# Patient Record
Sex: FEMALE | Race: WHITE | NOT HISPANIC OR LATINO | Employment: UNEMPLOYED | URBAN - METROPOLITAN AREA
[De-identification: names, ages, dates, MRNs, and addresses within clinical notes are randomized per-mention and may not be internally consistent; named-entity substitution may affect disease eponyms.]

---

## 2019-03-23 ENCOUNTER — HOSPITAL ENCOUNTER (EMERGENCY)
Facility: HOSPITAL | Age: 34
Discharge: HOME | End: 2019-03-23
Attending: EMERGENCY MEDICINE

## 2019-03-23 ENCOUNTER — APPOINTMENT (EMERGENCY)
Dept: RADIOLOGY | Facility: HOSPITAL | Age: 34
End: 2019-03-23

## 2019-03-23 VITALS
WEIGHT: 120 LBS | HEART RATE: 72 BPM | DIASTOLIC BLOOD PRESSURE: 55 MMHG | TEMPERATURE: 98.9 F | RESPIRATION RATE: 18 BRPM | OXYGEN SATURATION: 98 % | SYSTOLIC BLOOD PRESSURE: 94 MMHG

## 2019-03-23 DIAGNOSIS — N93.9 VAGINAL BLEEDING: Primary | ICD-10-CM

## 2019-03-23 LAB
ABO GROUP BLD: NORMAL
ANION GAP BLD CALC-SCNC: 17 MMOL/L (ref 4–13)
B-HCG SERPL-ACNC: 4 MIU/ML
BACTERIA UR QL AUTO: ABNORMAL /HPF
BILIRUB UR QL STRIP: ABNORMAL
BLD GP AB SCN SERPL QL: NEGATIVE
BUN BLD-MCNC: 15 MG/DL (ref 5–25)
CA-I BLD-SCNC: 1.18 MMOL/L (ref 1.12–1.32)
CHLORIDE BLD-SCNC: 104 MMOL/L (ref 100–108)
CLARITY UR: ABNORMAL
COLOR UR: ABNORMAL
CREAT BLD-MCNC: 0.6 MG/DL (ref 0.6–1.3)
EXT PREG TEST URINE: NEGATIVE
GFR SERPL CREATININE-BSD FRML MDRD: 119 ML/MIN/1.73SQ M
GLUCOSE SERPL-MCNC: 121 MG/DL (ref 65–140)
GLUCOSE UR STRIP-MCNC: ABNORMAL MG/DL
HCT VFR BLD CALC: 32 % (ref 34.8–46.1)
HGB BLDA-MCNC: 10.9 G/DL (ref 11.5–15.4)
HGB UR QL STRIP.AUTO: ABNORMAL
KETONES UR STRIP-MCNC: ABNORMAL MG/DL
LEUKOCYTE ESTERASE UR QL STRIP: ABNORMAL
NITRITE UR QL STRIP: ABNORMAL
NON-SQ EPI CELLS URNS QL MICRO: ABNORMAL /HPF
PCO2 BLD: 26 MMOL/L (ref 21–32)
PH UR STRIP.AUTO: ABNORMAL [PH]
POTASSIUM BLD-SCNC: 3.4 MMOL/L (ref 3.5–5.3)
PROT UR STRIP-MCNC: ABNORMAL MG/DL
RBC #/AREA URNS AUTO: ABNORMAL /HPF
RH BLD: POSITIVE
SODIUM BLD-SCNC: 143 MMOL/L (ref 136–145)
SP GR UR STRIP.AUTO: 1.03 (ref 1–1.03)
SPECIMEN EXPIRATION DATE: NORMAL
SPECIMEN SOURCE: ABNORMAL
UROBILINOGEN UR QL STRIP.AUTO: ABNORMAL E.U./DL
WBC #/AREA URNS AUTO: ABNORMAL /HPF

## 2019-03-23 PROCEDURE — 76815 OB US LIMITED FETUS(S): CPT

## 2019-03-23 PROCEDURE — 86901 BLOOD TYPING SEROLOGIC RH(D): CPT | Performed by: EMERGENCY MEDICINE

## 2019-03-23 PROCEDURE — 86850 RBC ANTIBODY SCREEN: CPT | Performed by: EMERGENCY MEDICINE

## 2019-03-23 PROCEDURE — 84702 CHORIONIC GONADOTROPIN TEST: CPT | Performed by: EMERGENCY MEDICINE

## 2019-03-23 PROCEDURE — 81001 URINALYSIS AUTO W/SCOPE: CPT | Performed by: EMERGENCY MEDICINE

## 2019-03-23 PROCEDURE — 99284 EMERGENCY DEPT VISIT MOD MDM: CPT

## 2019-03-23 PROCEDURE — 86900 BLOOD TYPING SEROLOGIC ABO: CPT | Performed by: EMERGENCY MEDICINE

## 2019-03-23 PROCEDURE — 36415 COLL VENOUS BLD VENIPUNCTURE: CPT | Performed by: EMERGENCY MEDICINE

## 2019-03-23 PROCEDURE — 87086 URINE CULTURE/COLONY COUNT: CPT | Performed by: EMERGENCY MEDICINE

## 2019-03-23 PROCEDURE — 81025 URINE PREGNANCY TEST: CPT | Performed by: EMERGENCY MEDICINE

## 2019-03-23 PROCEDURE — 85014 HEMATOCRIT: CPT

## 2019-03-23 PROCEDURE — 80047 BASIC METABLC PNL IONIZED CA: CPT

## 2019-03-23 RX ORDER — ACETAMINOPHEN 325 MG/1
975 TABLET ORAL ONCE
Status: DISCONTINUED | OUTPATIENT
Start: 2019-03-23 | End: 2019-03-23 | Stop reason: HOSPADM

## 2019-03-23 NOTE — ED PROVIDER NOTES
History  Chief Complaint   Patient presents with    Vaginal Bleeding - Pregnant     pt notes vaginal bleeding (brown clots yesterday, red today)  recent positive pregnancy tests  Pt also c/o back pain and abdominal cramping  27-year-old  at 4-5 weeks by dates presents for evaluation of vaginal bleeding  LMP   Patient reports a small amount of brown spotting starting yesterday  She was passing bright red blood with clots today  She has not bled through an entire pad  She notes lower abdominal cramping and low back pain  She previously had 3 C sections without other abdominal surgeries  She has been otherwise well with a negative review of systems  Patient has yet to see OB or have IUP confirmed by ultrasound  She does have an OB appointment scheduled  On arrival, patient's heart rate is 99 with otherwise normal vital signs  Physical exam shows a patient in no acute distress with a benign abdominal exam, no midline low back tenderness, and no CVA tenderness  Will perform bedside transabdominal ultrasound to evaluate for IUP  Will perform formal pelvic ultrasound, if needed  Will perform I-STAT chemistry to evaluate for hemoglobin  Will send urine dip to evaluate for UTI  Will send beta quantitative HCG and type and screen  None       History reviewed  No pertinent past medical history  Past Surgical History:   Procedure Laterality Date     SECTION      x3       History reviewed  No pertinent family history  I have reviewed and agree with the history as documented  Social History     Tobacco Use    Smoking status: Never Smoker   Substance Use Topics    Alcohol use: Never     Frequency: Never    Drug use: Never        Review of Systems   Constitutional: Negative for chills and fever  HENT: Negative for rhinorrhea and sore throat  Eyes: Negative for photophobia and visual disturbance  Respiratory: Negative for cough and shortness of breath  Cardiovascular: Negative for chest pain and leg swelling  Gastrointestinal: Positive for abdominal pain  Negative for diarrhea, nausea and vomiting  Genitourinary: Positive for vaginal bleeding  Negative for dysuria, frequency, hematuria, vaginal discharge and vaginal pain  Musculoskeletal: Positive for back pain  Negative for neck pain  Skin: Negative for rash and wound  Neurological: Negative for light-headedness and headaches  Hematological: Does not bruise/bleed easily  Physical Exam  ED Triage Vitals   Temperature Pulse Respirations Blood Pressure SpO2   03/23/19 1250 03/23/19 1249 03/23/19 1249 03/23/19 1249 03/23/19 1249   98 9 °F (37 2 °C) 99 16 119/56 100 %      Temp Source Heart Rate Source Patient Position - Orthostatic VS BP Location FiO2 (%)   03/23/19 1249 03/23/19 1249 03/23/19 1249 03/23/19 1249 --   Oral Monitor Sitting Right arm       Pain Score       03/23/19 1249       3             Orthostatic Vital Signs  Vitals:    03/23/19 1249 03/23/19 1533   BP: 119/56 94/55   Pulse: 99 72   Patient Position - Orthostatic VS: Sitting Sitting       Physical Exam   Constitutional: She is oriented to person, place, and time  She appears well-developed and well-nourished  No distress  HENT:   Head: Normocephalic and atraumatic  Eyes: Pupils are equal, round, and reactive to light  EOM are normal    Neck: Normal range of motion  Neck supple  Cardiovascular: Normal rate, regular rhythm and normal heart sounds  Exam reveals no gallop and no friction rub  No murmur heard  Pulmonary/Chest: Effort normal and breath sounds normal  No respiratory distress  She has no wheezes  She has no rales  Abdominal: Soft  She exhibits no distension  There is no tenderness  There is no rebound and no guarding  Musculoskeletal: She exhibits no edema or tenderness (  No midline spinal tenderness or CVA tenderness, no rash)  Neurological: She is alert and oriented to person, place, and time     Skin: Skin is warm and dry  Capillary refill takes less than 2 seconds  She is not diaphoretic  Psychiatric: She has a normal mood and affect  Her behavior is normal  Thought content normal    Vitals reviewed        ED Medications  Medications - No data to display    Diagnostic Studies  Results Reviewed     Procedure Component Value Units Date/Time    POCT pregnancy, urine [[id number]]  (Normal) Resulted:  03/23/19 1517    Lab Status:  Final result Updated:  03/23/19 1517     EXT PREG TEST UR (Ref: Negative) negative    Urine Microscopic [[id number]]  (Abnormal) Collected:  03/23/19 1340    Lab Status:  Final result Specimen:  Urine, Other Updated:  03/23/19 1427     RBC, UA Innumerable /hpf      WBC, UA 2-4 /hpf      Epithelial Cells None Seen /hpf      Bacteria, UA None Seen /hpf      URINE COMMENT --    Quantitative hCG [[id number]]  (Normal) Collected:  03/23/19 1336    Lab Status:  Final result Specimen:  Blood from Arm, Right Updated:  03/23/19 1411     HCG, Quant 4 mIU/mL     Narrative:        Expected Ranges:   Approximate               Approximate HCG  Gestation age          Concentration ( mIU/mL)  _____________          ______________________   Reeda Ponto                      HCG values  0 2-1                       5-50  1-2                           2-3                         100-5000  3-4                         500-56965  4-5                         1000-14940  5-6                         [id number]  6-8                         [id number]  8-12                        [id number]    UA w Reflex to Microscopic w Reflex to Culture [[id number]]  (Abnormal) Collected:  03/23/19 1340    Lab Status:  Final result Specimen:  Urine, Other Updated:  03/23/19 1409     Color, UA Bloody     Clarity, UA Cloudy     Specific [place], UA 1 030     pH, UA Interference-unable to analyze     Leukocytes, UA Interference- unable to analyze     Nitrite, UA Interference- unable to analyze     Protein, UA       Interference- unable to analyze     mg/dl     Glucose, UA       Interference- unable to analyze     mg/dl     Ketones, UA       Interference- unable to analyze     mg/dl     Urobilinogen, UA       Interference-unable to analyze     E U /dl     Bilirubin, UA Interference- unable to analyze     Blood, UA Interference- unable to analyze     URINE COMMENT --    Urine culture [[id number]] Collected:  19 1340    Lab Status: In process Specimen:  Urine, Other Updated:  19 1409    POCT Chem 8+ [[id number]]  (Abnormal) Collected:  19 1341    Lab Status:  Final result Specimen:  Venous Updated:  19 1345     SODIUM, I-STAT 143 mmol/l      Potassium, i-STAT 3 4 mmol/L      Chloride, istat 104 mmol/L      CO2, i-STAT 26 mmol/L      Anion Gap, i-STAT 17 mmol/L      Calcium, Ionized i-STAT 1 18 mmol/L      BUN, I-STAT 15 mg/dl      Creatinine, i-STAT 0 6 mg/dl      eGFR 119 ml/min/1 73sq m      Glucose, i-STAT 121 mg/dl      Hct, i-STAT 32 %      Hgb, i-STAT 10 9 g/dl      Specimen Type VENOUS                 US OB pregnancy limited with transvaginal   Final Result by Tisha Edmondson MD ( [id number])      1  No intrauterine gestation is seen and no adnexal mass is evident  These findings represent a pregnancy of unknown location  The sonographic differential diagnosis includes: an intrauterine gestation too early to visualize, a spontaneous , or    an occult ectopic pregnancy  Continued close clinical follow-up, serial serum beta-HCG levels and short term sonographic follow up in 14 days, or earlier if clinically indicated, is recommended         2  Right ovarian corpus luteum  The study was marked in EPIC for significant notification  Workstation performed: [id number]               Procedures  Procedures      Phone Consults  ED Phone Contact    ED Course  ED Course as of Mar 24 0927   Sat Mar 23, 2019   1408 Bedside transabdominal ultrasound not able to visualize yolk sac or fetal pole    Patient currently in Radiology for formal pelvic ultrasound  [facility]  Number of Diagnoses or Management Options  Vaginal bleeding:   Diagnosis management comments: Patient hemodynamically normal with a benign abdominal exam while in the ED  Formal pelvic US showed no evidence of IUP and no adnexal pathology  Beta quant 4  Hemoglobin 10 9  Rh +  Likely miscarriage with normalization of quant  Patient was discharged with close outpatient OB follow up and strict return precautions for worsening symptoms  Disposition  Final diagnoses:   Vaginal bleeding     Time reflects when diagnosis was documented in both MDM as applicable and the Disposition within this note     Time User Action Codes Description Comment    3/23/2019  3:39 PM Dedrick Hall Add [N93 9] Vaginal bleeding       ED Disposition     ED Disposition Condition Date/Time Comment    Discharge Stable Sat Mar 23, 2019  3:39 PM Kobe Henry discharge to home/self care  Follow-up Information     Follow up With Specialties Details Why Contact Info Additional [facility] Schedule an appointment as soon as possible for a visit in 2 days For re-evaluation and further management [address] [place] [address] Emergency Department Emergency Medicine  As needed [address] [place] [address]  [phone]  ED, [address] [place] [address], [facility]          There are no discharge medications for this patient  No discharge procedures on file  ED Provider  Attending physically available and evaluated Kobe Henry I managed the patient along with the ED Attending      Electronically Signed by         Christine Madison MD  03/24/19 6939

## 2019-03-23 NOTE — ED ATTENDING ATTESTATION
Adrienne Lopez MD, saw and evaluated the patient  I have discussed the patient with the resident/non-physician practitioner and agree with the resident's/non-physician practitioner's findings, Plan of Care, and MDM as documented in the resident's/non-physician practitioner's note, except where noted  All available labs and Radiology studies were reviewed  I was present for key portions of any procedure(s) performed by the resident/non-physician practitioner and I was immediately available to provide assistance  At this point I agree with the current assessment done in the Emergency Department    I have conducted an independent evaluation of this patient a history and physical is as follows:  Pt is approx 4 weeks by dates started with bleeding yesterday and now increased bleeding and cramping  PE: alert nad heart reg lungs clear abd soft mild suprapubic tenderness MDM: will get us and hcg  Critical Care Time  Procedures

## 2019-03-24 LAB — BACTERIA UR CULT: NORMAL

## 2019-04-03 ENCOUNTER — OFFICE VISIT (OUTPATIENT)
Dept: OBGYN CLINIC | Facility: CLINIC | Age: 34
End: 2019-04-03
Payer: COMMERCIAL

## 2019-04-03 VITALS
DIASTOLIC BLOOD PRESSURE: 60 MMHG | WEIGHT: 120 LBS | BODY MASS INDEX: 19.99 KG/M2 | SYSTOLIC BLOOD PRESSURE: 100 MMHG | HEIGHT: 65 IN

## 2019-04-03 DIAGNOSIS — O03.9 SPONTANEOUS ABORTION IN FIRST TRIMESTER: Primary | ICD-10-CM

## 2019-04-03 DIAGNOSIS — R10.11 RUQ PAIN: ICD-10-CM

## 2019-04-03 PROCEDURE — 99203 OFFICE O/P NEW LOW 30 MIN: CPT | Performed by: OBSTETRICS & GYNECOLOGY

## 2019-04-03 RX ORDER — ALBUTEROL SULFATE 90 UG/1
2 AEROSOL, METERED RESPIRATORY (INHALATION) EVERY 4 HOURS PRN
COMMUNITY

## 2019-10-08 ENCOUNTER — ANNUAL EXAM (OUTPATIENT)
Dept: OBGYN CLINIC | Facility: CLINIC | Age: 34
End: 2019-10-08
Payer: COMMERCIAL

## 2019-10-08 VITALS
DIASTOLIC BLOOD PRESSURE: 80 MMHG | HEIGHT: 64 IN | BODY MASS INDEX: 21 KG/M2 | WEIGHT: 123 LBS | SYSTOLIC BLOOD PRESSURE: 122 MMHG

## 2019-10-08 DIAGNOSIS — Z30.011 BCP (BIRTH CONTROL PILLS) INITIATION: Primary | ICD-10-CM

## 2019-10-08 DIAGNOSIS — Z01.419 ENCOUNTER FOR ANNUAL ROUTINE GYNECOLOGICAL EXAMINATION: ICD-10-CM

## 2019-10-08 PROCEDURE — 99395 PREV VISIT EST AGE 18-39: CPT | Performed by: OBSTETRICS & GYNECOLOGY

## 2019-10-08 RX ORDER — NORGESTIMATE AND ETHINYL ESTRADIOL 0.25-0.035
1 KIT ORAL DAILY
Qty: 90 TABLET | Refills: 3 | Status: SHIPPED | OUTPATIENT
Start: 2019-10-08

## 2019-10-08 NOTE — PROGRESS NOTES
Impression:  1  Normal breast and gyn exam    Plan:  1  Rx orthocyclen, start   2  RTO for pap   3  Healthy diet and exercise discussed      Subjective:      Patient ID: Jeny Bautista is a 28 y o    female  S/p spontaneous AB  She is currently sexually active and would like to start OCs for birth control, as she does not plan to have anymore pregnancies  Since her miscarriage, periods have been once per month but heavier  Review of Systems   Constitutional: Negative  HENT: Negative  Eyes: Negative  Respiratory: Negative  Cardiovascular: Negative  Gastrointestinal: Negative  Endocrine: Negative  Genitourinary: Positive for menstrual problem  Musculoskeletal: Negative  Skin: Negative  Allergic/Immunologic: Negative  Neurological: Negative  Hematological: Negative  Psychiatric/Behavioral: Negative  All other systems reviewed and are negative  Objective:      /80 (BP Location: Left arm, Patient Position: Sitting, Cuff Size: Standard)   Ht 5' 3 98" (1 625 m)   Wt 55 8 kg (123 lb)   LMP 10/04/2019 (Exact Date)   BMI 21 13 kg/m²          Physical Exam   Constitutional: She is oriented to person, place, and time  She appears well-developed and well-nourished  HENT:   Head: Normocephalic and atraumatic  Neck: Normal range of motion  Neck supple  No tracheal deviation present  No thyromegaly present  Cardiovascular: Normal rate, regular rhythm and normal heart sounds  Pulmonary/Chest: Effort normal and breath sounds normal  No stridor  No respiratory distress  She has no wheezes  She has no rales  She exhibits no tenderness  Right breast exhibits no inverted nipple, no mass, no nipple discharge, no skin change and no tenderness  Left breast exhibits no inverted nipple, no mass, no nipple discharge, no skin change and no tenderness  No breast swelling, tenderness, discharge or bleeding  Breasts are symmetrical    Abdominal: Soft   Bowel sounds are normal  She exhibits no distension and no mass  There is no tenderness  There is no rebound and no guarding  No hernia  Hernia confirmed negative in the right inguinal area and confirmed negative in the left inguinal area  Genitourinary: Vagina normal and uterus normal  Rectal exam shows no external hemorrhoid, no internal hemorrhoid, no fissure and no mass  No breast swelling, tenderness, discharge or bleeding  No labial fusion  There is no rash, tenderness, lesion or injury on the right labia  There is no rash, tenderness, lesion or injury on the left labia  Uterus is not deviated, not enlarged, not fixed and not tender  Cervix exhibits no motion tenderness, no discharge and no friability  Right adnexum displays no mass, no tenderness and no fullness  Left adnexum displays no mass, no tenderness and no fullness  No erythema, tenderness or bleeding in the vagina  No foreign body in the vagina  No signs of injury around the vagina  No vaginal discharge found  Genitourinary Comments: Unable to perform pap smear because still bleeding from menstrual cycle  Lymphadenopathy: No inguinal adenopathy noted on the right or left side  Neurological: She is alert and oriented to person, place, and time  Skin: Skin is warm and dry  Psychiatric: She has a normal mood and affect   Her behavior is normal  Judgment and thought content normal

## 2019-10-08 NOTE — PROGRESS NOTES
The patient is here for a yearly  The patient has her period but she wants to have the pap smear if she can have it  She has new insurance  The patient has been having regular periods  The patient has been having longer periods  Her periods have been 10-15 days long  The patient has heavy periods  No vaginal or urinary issues  No breast concerns

## 2019-10-15 ENCOUNTER — OFFICE VISIT (OUTPATIENT)
Dept: OBGYN CLINIC | Facility: CLINIC | Age: 34
End: 2019-10-15
Payer: COMMERCIAL

## 2019-10-15 VITALS
WEIGHT: 127 LBS | DIASTOLIC BLOOD PRESSURE: 60 MMHG | SYSTOLIC BLOOD PRESSURE: 100 MMHG | HEIGHT: 64 IN | BODY MASS INDEX: 21.68 KG/M2

## 2019-10-15 DIAGNOSIS — Z11.51 SCREENING FOR HPV (HUMAN PAPILLOMAVIRUS): ICD-10-CM

## 2019-10-15 DIAGNOSIS — Z12.4 ROUTINE CERVICAL SMEAR: Primary | ICD-10-CM

## 2019-10-15 PROCEDURE — 99213 OFFICE O/P EST LOW 20 MIN: CPT | Performed by: PHYSICIAN ASSISTANT

## 2019-10-15 NOTE — PROGRESS NOTES
Assessment/Plan:    No problem-specific Assessment & Plan notes found for this encounter  Diagnoses and all orders for this visit:    Routine cervical smear  -     GP Pap Dependent HPV Cotest >= 27years old    Screening for HPV (human papillomavirus)  -     GP Pap Dependent HPV Cotest >= 27years old          Subjective:      Patient ID: Chris Cantor is a 27 y o  female  Pt is here for pap smear only  Has period at her last OV/yearly here last week  Bleeding has stopped  Has concerns about diastasis recti--info on [facility] pt given    Pap done      The following portions of the patient's history were reviewed and updated as appropriate: allergies, current medications, past family history, past medical history, past social history, past surgical history and problem list     Review of Systems   Constitutional: Negative for chills, fever and unexpected weight change  Gastrointestinal: Negative for abdominal pain, blood in stool, constipation and diarrhea  Genitourinary: Negative  Objective:      /60 (BP Location: Left arm, Patient Position: Sitting, Cuff Size: Standard)   Ht 5' 3 78" (1 62 m)   Wt 57 6 kg (127 lb)   LMP 10/04/2019 (Exact Date)   BMI 21 95 kg/m²          Physical Exam   Constitutional: She appears well-developed and well-nourished  Genitourinary: Vagina normal  Pelvic exam was performed with patient supine  There is no rash or lesion on the right labia  There is no rash or lesion on the left labia  Cervix exhibits no motion tenderness, no discharge and no friability  Lymphadenopathy: No inguinal adenopathy noted on the right or left side  Nursing note and vitals reviewed

## 2019-10-15 NOTE — PROGRESS NOTES
The patient is here for a pap smear  The patient has regular periods  No vaginal issues  The patient has no complaints

## 2019-10-23 LAB
HPV HR 12 DNA CVX QL NAA+PROBE: NOT DETECTED
HPV16 DNA SPEC QL NAA+PROBE: NOT DETECTED
HPV18 DNA SPEC QL NAA+PROBE: NOT DETECTED
THIN PREP CVX: NORMAL

## 2020-12-26 ENCOUNTER — NURSE TRIAGE (OUTPATIENT)
Dept: OTHER | Facility: OTHER | Age: 35
End: 2020-12-26

## 2020-12-26 DIAGNOSIS — Z11.59 ENCOUNTER FOR SCREENING FOR OTHER VIRAL DISEASES: Primary | ICD-10-CM

## 2020-12-27 DIAGNOSIS — Z11.59 ENCOUNTER FOR SCREENING FOR OTHER VIRAL DISEASES: ICD-10-CM

## 2020-12-27 PROCEDURE — U0003 INFECTIOUS AGENT DETECTION BY NUCLEIC ACID (DNA OR RNA); SEVERE ACUTE RESPIRATORY SYNDROME CORONAVIRUS 2 (SARS-COV-2) (CORONAVIRUS DISEASE [COVID-19]), AMPLIFIED PROBE TECHNIQUE, MAKING USE OF HIGH THROUGHPUT TECHNOLOGIES AS DESCRIBED BY CMS-2020-01-R: HCPCS | Performed by: FAMILY MEDICINE

## 2020-12-29 LAB — SARS-COV-2 RNA SPEC QL NAA+PROBE: DETECTED

## 2021-01-01 ENCOUNTER — TELEPHONE (OUTPATIENT)
Dept: GASTROENTEROLOGY | Facility: CLINIC | Age: 36
End: 2021-01-01

## 2021-01-01 NOTE — TELEPHONE ENCOUNTER
The patient was called for notification of a test result for COVID-19  The patient did not answer the phone and a voicemail was left requesting a call back to [phone], Option 7

## 2021-01-03 NOTE — TELEPHONE ENCOUNTER
The patient was called for notification of a test result for COVID-19  The patient did not answer the phone and a voicemail was left requesting a call back to [phone], Option 7